# Patient Record
Sex: FEMALE | Race: WHITE | Employment: OTHER | URBAN - METROPOLITAN AREA
[De-identification: names, ages, dates, MRNs, and addresses within clinical notes are randomized per-mention and may not be internally consistent; named-entity substitution may affect disease eponyms.]

---

## 2017-12-29 NOTE — Clinical Note
*AWAITING BED W/ HIGH WEIGHT LIMIT @ [facility]. [facility] NURSING SUPERVISOR (x [id number] & [id number]) PLACING WORK ORDER.

## 2017-12-30 PROBLEM — F33.9 MAJOR DEPRESSION, RECURRENT (HCC): Status: ACTIVE | Noted: 2017-12-30

## 2017-12-30 PROBLEM — F43.10 PTSD (POST-TRAUMATIC STRESS DISORDER): Status: ACTIVE | Noted: 2017-12-30

## 2017-12-30 PROBLEM — F33.9 MAJOR DEPRESSION, RECURRENT (HCC): Status: RESOLVED | Noted: 2017-12-30 | Resolved: 2017-12-30

## 2017-12-30 NOTE — BH LEVEL OF CARE ASSESSMENT
Level of Care Assessment Note    General Questions  Why are you here?: INCREASED DEPRESSION & SI W/ PLAN TO EITHER CUT WRISTS OR OVERDOSE ON IBUPROFEN. W/OUT PSYCH MEDS x 1 MONTH.   THRESHOLDS  WOULN'T SEE HER @ HER FRIEND'S HOME IN [facility] [place] Ideation: Yes  Date of Most Recent Suicidal Ideation: 12/29/17  Current/Recent/Future Suicide Plan: Yes  Date of Most Recent Suicide Plan: 12/29/17  Current/Recent/Future Suicidal Intent: Yes  Date of Most Recent Suicidal Intent: 12/29/17  Describe Current disturbance;Isolative; Increased irritability; Feelings of worthlessness; Appetite change; Change in energy level; Loss of interest  Anxiety Symptoms: Generalized  Trauma Reaction: Flashbacks  Bipolar Symptoms: No problems reported or observed  Sleep Pattern: O No  Breathalyzer (when indicated): 0 (BAL = < 3  ( NONE DETETCTED ))  Process Addiction/ Behaviors  Repetitive/Compulsive Behaviors in the past 30 days: No                Functional Impairment  Currently Attending School: No  Employment Status: Disabled (S LET HER LEAVE & DROVE HER TO ER INSTEAD. D/C'D FROM [facility] 11/17/207. [address], [facility] [address], [employer] RXs. Angelika Tolbert MD 2.5 WEEKS AGO & HE CALLED THE RXs IN TO THE WRONG PHARMACY. TOO OVERWHELMED TO PICK THEM UP.    DID GET THE RX FOR WE ISSUES    Sign-In  Information Provided: Program information  Expected Discharge Date: 01/03/18              SRAT Review  Behavioral Precautions: Suicide  Medical Precautions: None

## 2017-12-30 NOTE — ED NOTES
Pt reports slight tightness in chest and requesting breathing treatment. Dr. Andrei Mendez aware and RT paged.

## 2017-12-30 NOTE — ED NOTES
Pt calm and cooperative, pt belongings placed in smart safe bag [id number] and pt wallet, keys and cell phone placed in smart safe bag [id number] and placed in pt suitcase labeled at nurses station.

## 2017-12-30 NOTE — ED PROVIDER NOTES
Patient Seen in: [facility] Emergency Department    History   Patient presents with:  Eval-P (psychiatric)    Stated Complaint: si    HPI    43-year-old female with history of depression presents for evaluation of suicidal thoughts.   Patient states Normal speech pattern  Musculoskeletal: No tenderness or deformity noted. Full range of motion throughout.         ED Course     Labs Reviewed   COMP METABOLIC PANEL (14) - Abnormal; Notable for the following:        Result Value    Alkaline Phosphatase 1

## 2018-01-17 PROBLEM — F33.9 MAJOR DEPRESSION, RECURRENT (HCC): Status: ACTIVE | Noted: 2018-01-17

## 2018-01-17 NOTE — BH PROGRESS NOTE
Adult unit did not call writer back. Writer called unit again to provide report. Report given to Lindsey. Nurse to nurse information place on pts chart with petition. ER md to complete cert.

## 2018-01-17 NOTE — BH PROGRESS NOTE
Pt aware and agreeable to inpt care. Pt aware and agreeable to boarding. Writer called to give unit report and no one was available. Adult unit will call writer back. Once writer gives unit report, writer will give ER RN the number for nurse to nurse.

## 2018-01-17 NOTE — PROGRESS NOTES
01/17/18 1330   General Appearance   Characteristics Appropriate clothing;Good hygiene   Eye Contact Direct   Psychomotor Behavior   Gait/Movement Steady   Abnormal movements None   Posture Relaxed   Rate of Movement Normal   Mood and Affect   Mood or F

## 2018-01-17 NOTE — ED PROVIDER NOTES
Patient Seen in: [facility] Emergency Department    History   Patient presents with:  Eval-P (psychiatric)    Stated Complaint: +s/i with a plan    HPI    Patient presents to the emergency department with t suicidal ideation.   She states that she ha facial hair, very flat affect, lungs are clear to auscultation heart has regular rate and rhythm without murmurs rubs or gallops abdomen is obese but soft nontender extremities are benign    ED Course     Labs Reviewed   COMP METABOLIC PANEL (14) - Abnorma transfer  1/17/2018  2:10 pm    Follow-up:  No follow-up provider specified.       Medications Prescribed:  Current Discharge Medication List

## 2018-01-17 NOTE — BH LEVEL OF CARE ASSESSMENT
Level of Care Assessment Note    General Questions  Why are you here?: depression and SI with plan  Precipitating Events: Pt was recently dc from [facility] on 1/4/18.  Pt reports feeling better after last inpt, pt rpeorts she was restarted on most of her meds and Mitigating Factors: coming to  er.  Pt reports she has a good support network  Current/Recent Suicide Risk Collateral Provided By[de-identified] per er  Describe Current/Recent Suicide Risk Collateral: pt reports increase in SI and has plan to overdose  Past Suicidal Id problems reported or observed  Depression Symptoms: Change in energy level;Crying; Feelings of helplessness; Feelings of hopelessess; Feelings of worthlessness;Sleep disturbance; Appetite change;Isolative; Loss of interest  Depression Description: Pt rpeorts hx Residential  Name: NONE  Prior Psychiatrist  Name: Dinah Hernandez in [address] [place] [address]  Dates of Treatment: 2.5 yrs  Date Last Seen: sept 2017  Reason: medications  Current/Previous MH/CD Treatment  Recovery Support Groups:  Other support groups (comme Contact: Direct  Psychomotor Behavior  Gait/Movement: Steady  Abnormal movements: None  Posture: Relaxed  Rate of Movement: Normal  Mood and Affect  Mood or Feelings: Depressed; Hopeless; Worthless;Calm  Appropriateness of Affect: Congruent to mood  Range of Suicide  Medical Precautions: None  Refused Treatment: No  Education Provided: Call 911 in an Emergency;[facility] Crisis Line Number;Advised to call if condition worsens; Advised to call with questions  Transferred: No    Primary Psychiatric Diagnosis  Depressive

## 2018-01-18 PROBLEM — F33.9 MAJOR DEPRESSION, RECURRENT (HCC): Status: RESOLVED | Noted: 2018-01-17 | Resolved: 2018-01-18

## 2018-11-29 PROBLEM — F33.3 MAJOR PSYCHOTIC DEPRESSION, RECURRENT (HCC): Status: ACTIVE | Noted: 2018-11-29

## 2018-11-30 PROBLEM — F33.3 MAJOR PSYCHOTIC DEPRESSION, RECURRENT (HCC): Status: RESOLVED | Noted: 2018-11-29 | Resolved: 2018-11-30

## 2018-12-05 ENCOUNTER — APPOINTMENT (OUTPATIENT)
Dept: GENERAL RADIOLOGY | Facility: HOSPITAL | Age: 28
End: 2018-12-05
Attending: EMERGENCY MEDICINE
Payer: MEDICARE

## 2018-12-05 ENCOUNTER — HOSPITAL ENCOUNTER (OUTPATIENT)
Facility: HOSPITAL | Age: 28
Setting detail: OBSERVATION
Discharge: HOME | End: 2018-12-07
Attending: EMERGENCY MEDICINE | Admitting: HOSPITALIST
Payer: MEDICARE

## 2018-12-05 DIAGNOSIS — G47.33 OBSTRUCTIVE SLEEP APNEA: ICD-10-CM

## 2018-12-05 DIAGNOSIS — R06.89 HYPERCARBIA: ICD-10-CM

## 2018-12-05 DIAGNOSIS — F32.A DEPRESSION, UNSPECIFIED DEPRESSION TYPE: ICD-10-CM

## 2018-12-05 DIAGNOSIS — R40.0 SOMNOLENCE, DAYTIME: Primary | ICD-10-CM

## 2018-12-05 PROBLEM — E87.2 RESPIRATORY ACIDOSIS: Status: ACTIVE | Noted: 2018-12-05

## 2018-12-05 PROBLEM — E87.3 METABOLIC ALKALOSIS: Status: ACTIVE | Noted: 2018-12-05

## 2018-12-05 PROCEDURE — 99220 INITIAL OBSERVATION CARE,LEVL III: CPT | Performed by: INTERNAL MEDICINE

## 2018-12-05 PROCEDURE — 71045 X-RAY EXAM CHEST 1 VIEW: CPT | Performed by: EMERGENCY MEDICINE

## 2018-12-05 RX ORDER — MAGNESIUM HYDROXIDE/ALUMINUM HYDROXICE/SIMETHICONE 120; 1200; 1200 MG/30ML; MG/30ML; MG/30ML
30 SUSPENSION ORAL EVERY 4 HOURS PRN
COMMUNITY

## 2018-12-05 RX ORDER — ALBUTEROL SULFATE 2.5 MG/3ML
2.5 SOLUTION RESPIRATORY (INHALATION)
Status: DISCONTINUED | OUTPATIENT
Start: 2018-12-05 | End: 2018-12-07

## 2018-12-05 RX ORDER — METOCLOPRAMIDE HYDROCHLORIDE 5 MG/ML
10 INJECTION INTRAMUSCULAR; INTRAVENOUS EVERY 8 HOURS PRN
Status: DISCONTINUED | OUTPATIENT
Start: 2018-12-05 | End: 2018-12-06

## 2018-12-05 RX ORDER — IBUPROFEN 600 MG/1
600 TABLET ORAL ONCE
Status: COMPLETED | OUTPATIENT
Start: 2018-12-05 | End: 2018-12-05

## 2018-12-05 RX ORDER — ONDANSETRON 4 MG/1
4 TABLET, ORALLY DISINTEGRATING ORAL EVERY 4 HOURS PRN
COMMUNITY

## 2018-12-05 RX ORDER — PANTOPRAZOLE SODIUM 20 MG/1
20 TABLET, DELAYED RELEASE ORAL
Status: DISCONTINUED | OUTPATIENT
Start: 2018-12-06 | End: 2018-12-07

## 2018-12-05 RX ORDER — ACETAMINOPHEN 325 MG/1
650 TABLET ORAL EVERY 6 HOURS PRN
Status: DISCONTINUED | OUTPATIENT
Start: 2018-12-05 | End: 2018-12-07

## 2018-12-05 RX ORDER — DEXTROSE MONOHYDRATE 25 G/50ML
50 INJECTION, SOLUTION INTRAVENOUS
Status: DISCONTINUED | OUTPATIENT
Start: 2018-12-05 | End: 2018-12-07

## 2018-12-05 RX ORDER — HEPARIN SODIUM 5000 [USP'U]/ML
5000 INJECTION, SOLUTION INTRAVENOUS; SUBCUTANEOUS EVERY 8 HOURS SCHEDULED
Status: DISCONTINUED | OUTPATIENT
Start: 2018-12-05 | End: 2018-12-05

## 2018-12-05 RX ORDER — IBUPROFEN 600 MG/1
600 TABLET ORAL EVERY 6 HOURS PRN
COMMUNITY

## 2018-12-05 RX ORDER — BACLOFEN 10 MG/1
10 TABLET ORAL 2 TIMES DAILY PRN
Status: DISCONTINUED | OUTPATIENT
Start: 2018-12-05 | End: 2018-12-07

## 2018-12-05 RX ORDER — HEPARIN SODIUM 5000 [USP'U]/ML
7500 INJECTION, SOLUTION INTRAVENOUS; SUBCUTANEOUS EVERY 8 HOURS SCHEDULED
Status: DISCONTINUED | OUTPATIENT
Start: 2018-12-06 | End: 2018-12-07

## 2018-12-05 RX ORDER — TOPIRAMATE 25 MG/1
50 TABLET ORAL 2 TIMES DAILY
Status: DISCONTINUED | OUTPATIENT
Start: 2018-12-05 | End: 2018-12-07

## 2018-12-05 RX ORDER — LIOTHYRONINE SODIUM 25 UG/1
25 TABLET ORAL
Status: DISCONTINUED | OUTPATIENT
Start: 2018-12-06 | End: 2018-12-07

## 2018-12-05 RX ORDER — ONDANSETRON 2 MG/ML
4 INJECTION INTRAMUSCULAR; INTRAVENOUS EVERY 6 HOURS PRN
Status: DISCONTINUED | OUTPATIENT
Start: 2018-12-05 | End: 2018-12-07

## 2018-12-05 RX ORDER — RISPERIDONE 1 MG/1
3 TABLET, FILM COATED ORAL 2 TIMES DAILY
Status: DISCONTINUED | OUTPATIENT
Start: 2018-12-05 | End: 2018-12-06

## 2018-12-05 RX ORDER — TRAZODONE HYDROCHLORIDE 100 MG/1
200 TABLET ORAL NIGHTLY
Status: DISCONTINUED | OUTPATIENT
Start: 2018-12-05 | End: 2018-12-06

## 2018-12-05 RX ORDER — PANTOPRAZOLE SODIUM 20 MG/1
40 TABLET, DELAYED RELEASE ORAL
COMMUNITY

## 2018-12-05 RX ORDER — HYDROCODONE BITARTRATE AND ACETAMINOPHEN 10; 325 MG/1; MG/1
1 TABLET ORAL 2 TIMES DAILY PRN
Status: DISCONTINUED | OUTPATIENT
Start: 2018-12-05 | End: 2018-12-06

## 2018-12-05 RX ORDER — TRAZODONE HYDROCHLORIDE 100 MG/1
200 TABLET ORAL NIGHTLY
Status: ON HOLD | COMMUNITY
End: 2018-12-07

## 2018-12-05 RX ORDER — ALBUTEROL SULFATE 90 UG/1
2 AEROSOL, METERED RESPIRATORY (INHALATION) EVERY 4 HOURS PRN
Status: DISCONTINUED | OUTPATIENT
Start: 2018-12-05 | End: 2018-12-07

## 2018-12-05 RX ORDER — ALBUTEROL SULFATE 2.5 MG/3ML
2.5 SOLUTION RESPIRATORY (INHALATION) ONCE
Status: COMPLETED | OUTPATIENT
Start: 2018-12-05 | End: 2018-12-05

## 2018-12-05 RX ORDER — DIAPER,BRIEF,INFANT-TODD,DISP
EACH MISCELLANEOUS 2 TIMES DAILY
COMMUNITY

## 2018-12-05 RX ORDER — LOSARTAN POTASSIUM 25 MG/1
25 TABLET ORAL DAILY
Status: DISCONTINUED | OUTPATIENT
Start: 2018-12-05 | End: 2018-12-07

## 2018-12-05 RX ORDER — HYDROCODONE BITARTRATE AND ACETAMINOPHEN 5; 325 MG/1; MG/1
1 TABLET ORAL 2 TIMES DAILY PRN
Status: ON HOLD | COMMUNITY
End: 2018-12-07

## 2018-12-05 NOTE — CONSULTS
[facility]  Report of Consultation    Charli Bermeo Patient Status:  Emergency    10/9/1990 MRN [id number]   Location [address] Attending Ann-Marie Coronel MD   Hosp Day # 0 PCP None Pcp     Reason for Consultation:  Hypo total) by mouth nightly. (Patient taking differently: Take 200 mg by mouth nightly.  )   Vortioxetine HBr 20 MG Oral Tab Take 20 mg by mouth daily.    Albuterol Sulfate  (90 Base) MCG/ACT Inhalation Aero Soln Inhale 2 puffs into the lungs every 4 (fo 364 lb (165.1 kg)       Wt Readings from Last 6 Encounters:  12/05/18 : (!) 364 lb (165.1 kg)  01/17/18 : (!) 354 lb (160.6 kg)    Intake/Output:  [unfilled]  @IOTHIS SHIFT@    Lab Data Review:  Recent Labs      12/05/18   1337   WBC  7.4   HGB  12.3

## 2018-12-05 NOTE — ED NOTES
Pt resting comfortably in ER cart, Pt has been sleeping, and thus, Pt's oxygen level has been dropping between 88-91% on room air, therefore, PT immediately placed on 2Lit NC.

## 2018-12-05 NOTE — ED NOTES
Pt stable, updated to care plan, new HAYDE \"sitter\" at bedside for PT safety. Updated Pt's sitter to care plan.

## 2018-12-05 NOTE — ED PROVIDER NOTES
Patient Seen in: [facility] Emergency Department    History   Patient presents with:  Dyspnea PHAM SOB (respiratory)  Apnea (respiratory)    Stated Complaint: pham    HPI    26-year-old female complaining of lethargy.   This patient comes from [facility] (Approximate)   SpO2 91%   BMI 66.58 kg/m²         Physical Exam    Patient's morbidly obese her weight is 165 kg her BMI 66. She is somewhat lethargic but easily opens her eyes and answers questions her O2 sat was 94% on room air.   HEENT exam oropharynx the D-Dimer is greater than 0.50 ug/mL (FEU). Proceed with caution from clinical presentation.    URINALYSIS WITH CULTURE REFLEX   POCT PREGNANCY, URINE   RAINBOW DRAW BLUE   RAINBOW DRAW LAVENDER   RAINBOW DRAW LIGHT GREEN   RAINBOW DRAW GOLD     EKG

## 2018-12-05 NOTE — ED INITIAL ASSESSMENT (HPI)
PT rpt feeling CAROL and \"exhausted\" for past 2-days, however, today it was worse. Pt rpt she is in the process of awaiting an official sleep study to be dx w/ sleep apnea, but has no diagnosis as of yet, and has not been on oxygen at night.   Please note t

## 2018-12-05 NOTE — H&P
[facility]  History and Physical     [place] Patient Status:  Emergency    10/9/1990 MRN [id number]   Location [address] [facility] Attending Criselda Deleon MD   Hosp Day # 0 PCP None Pcp     Chief Complaint: SOB    H Rfl:    Paliperidone (INVEGA OR) Take 6 mg by mouth 2 (two) times daily. Disp:  Rfl:    TraZODone HCl 300 MG Oral Tab Take 1 tablet (300 mg total) by mouth nightly.  (Patient taking differently: Take 200 mg by mouth nightly.  ) Disp: 30 tablet Rfl: 0   Vort Psychiatric: Appropriate mood and affect.       Diagnostic Data:      Labs:  Recent Labs   Lab  11/30/18   0651  12/05/18   1337   WBC  8.3  7.4   HGB  15.0  12.3   MCV  88.5  88.5   PLT  423.0  320.0       Recent Labs   Lab  11/30/18   0651  12/05/18   1

## 2018-12-05 NOTE — ED NOTES
Pt resting in ER cart w/ bilateral side rails elevated, Pt remains on cardiac and SpO2 monitor, Pt vitals stable, PT breathing slightly labored, HAYDE sitter remains at Pt bedside.    Confirmed w/ food services Pt food tray is ready and will be picked up by E

## 2018-12-06 ENCOUNTER — APPOINTMENT (OUTPATIENT)
Dept: CV DIAGNOSTICS | Facility: HOSPITAL | Age: 28
End: 2018-12-06
Attending: INTERNAL MEDICINE
Payer: MEDICARE

## 2018-12-06 PROCEDURE — 99225 SUBSEQUENT OBSERVATION CARE: CPT | Performed by: INTERNAL MEDICINE

## 2018-12-06 PROCEDURE — 90792 PSYCH DIAG EVAL W/MED SRVCS: CPT | Performed by: OTHER

## 2018-12-06 PROCEDURE — 93306 TTE W/DOPPLER COMPLETE: CPT | Performed by: INTERNAL MEDICINE

## 2018-12-06 RX ORDER — POTASSIUM CHLORIDE 20 MEQ/1
60 TABLET, EXTENDED RELEASE ORAL DAILY
Status: DISCONTINUED | OUTPATIENT
Start: 2018-12-06 | End: 2018-12-06

## 2018-12-06 RX ORDER — HYDROCODONE BITARTRATE AND ACETAMINOPHEN 5; 325 MG/1; MG/1
1 TABLET ORAL 2 TIMES DAILY PRN
Status: DISCONTINUED | OUTPATIENT
Start: 2018-12-06 | End: 2018-12-07

## 2018-12-06 RX ORDER — PALIPERIDONE 6 MG/1
6 TABLET, EXTENDED RELEASE ORAL 2 TIMES DAILY
Status: DISCONTINUED | OUTPATIENT
Start: 2018-12-06 | End: 2018-12-07

## 2018-12-06 RX ORDER — TRAZODONE HYDROCHLORIDE 100 MG/1
200 TABLET ORAL NIGHTLY PRN
Status: DISCONTINUED | OUTPATIENT
Start: 2018-12-06 | End: 2018-12-07

## 2018-12-06 NOTE — PROGRESS NOTES
Per patient, she does not have anyone that can bring her Vortioxetine medication to the hospital.  Patient states she was receiving the medication at [facility]. Per pharmacy, it is not available at [address]

## 2018-12-06 NOTE — ED NOTES
PT finished eating dinner w/o difficulty or incident, Pt resting comfortably in ER cart, breathing non labored, HAYDE sitter at bedside, Pt no complaints at this time.  Pt was able to ambulate independently to the bathroom w/o difficulty or incident, urinary

## 2018-12-06 NOTE — PLAN OF CARE
NURSING ADMISSION NOTE      Patient admitted via Cart  Oriented to room. Safety precautions initiated. Bed in low position. Call light in reach. Patient admitted to [address] [facility] very lethargic.  Sleeping upon transfer but awoke to answer orientation quest

## 2018-12-06 NOTE — RESPIRATORY THERAPY NOTE
Attempted to do pre/post PFT. Pt just asleep within the past hour. Unable to arouse pt enough to do PFT. Will do in the morning when pt is awake.

## 2018-12-06 NOTE — PROGRESS NOTES
[facility]  Progress Note    Franck Iglesias Patient Status:  Observation    10/9/1990 MRN [id number]   [facility] 3NE-A Attending Jessica Patel MD   [facility] Day # 0 PCP None Pcp     Subjective:  Franck Iglesias is a(n) 33year old female HTN     Morbid obesity ([facility] [address]     Obstructive sleep apnea     Hypercarbia     Depression, unspecified depression type    ASSESSMENT  1. Hypoxia /hypercapnia suspected multifactorial with component of bronchospasm and suspected OHS /CHARISMA  2.  Snoring / apneas-

## 2018-12-06 NOTE — ED NOTES
RN to RN rpt given, all questions answered, w/o difficulty; ER RN to transport PT to the floor w/ cardiac monitor attached

## 2018-12-06 NOTE — CONSULTS
[facility]  Report of Psychiatric Consultation    Janyalice Mtzsavanah Patient Status:  Observation    10/9/1990 MRN [id number]   [facility] 3NE-A Attending Clarice Howard [address] Day # 0 PCP None Pcp     Date of Admission: 18 prn pain. Use Norco 5/325 dosing 1 tab twice a day prn mod/severe pain. 8) She will need access to an Auto-PAP machine until she gets a sleep study     9) At this time, she does NOT need to go back to [facility].  Once medically stable, she can be disc to get back at the group home staff that upset her. Her Psych APN is [facility]. Her therapist for 2 yrs is Marlene Garnica.     Past Meds- Adderall, Nortriptlyine, hydroxyzine, Abilify, Seroquel, Riseperdal, Latuda, Lithium, Depakote, Lamictal, Pr HYDROcodone-acetaminophen (NORCO)  MG per tab 1 tablet, 1 tablet, Oral, BID PRN  •  Liothyronine Sodium (CYTOMEL) tab 25 mcg, 25 mcg, Oral, Before breakfast  •  Losartan Potassium (COZAAR) tab 25 mg, 25 mg, Oral, Daily  •  Pantoprazole Sodium (PROTON ideation  Homicidal ideation: None noted    Objective       12/06/18  0900   BP: 121/54   Pulse: 71   Resp: 24   Temp: 98.3 °F (36.8 °C)     Appearance: Obese, sitting up in bed, in no acute distress  Behavior: normal psychomotor  Attitude: cooperative and

## 2018-12-06 NOTE — RESPIRATORY THERAPY NOTE
CHARISMA Equipment Usage Summary :            Set Mode :AUTO CPAP W FLEX          Usage in Hours:9;08          90% Pressure (EPAP) : 19           90% Insp Pressure (IPAP);           AHI : 6.5          Supplemental Oxygen : 3     LPM

## 2018-12-06 NOTE — PROGRESS NOTES
[facility]  Progress Note     Jessica Rubin Patient Status:  Observation    10/9/1990 MRN [id number]   [facility] 3NE-A Attending Tony Blandon MD   Hosp Day # 0 PCP None Pcp     Chief Complaint: SOB, fatigue    S: Patient Beata Peters Potassium  25 mg Oral Daily   • Pantoprazole Sodium  20 mg Oral QAM AC   • risperiDONE  3 mg Oral BID   • topiramate  50 mg Oral BID   • TraZODone HCl  200 mg Oral Nightly   • Vortioxetine HBr  20 mg Oral Daily   • Insulin Aspart Pen  1-5 Units Subcutaneou

## 2018-12-06 NOTE — PLAN OF CARE
Pt still lethargic and sleeping all night. Tolerating CPAP, NSR-ST on tele.  Will encourage patient to get up for morning ADLs and handoff to oncoming RN    Diabetes/Glucose Control    • Glucose maintained within prescribed range Progressing        Impaired

## 2018-12-07 VITALS
RESPIRATION RATE: 22 BRPM | DIASTOLIC BLOOD PRESSURE: 82 MMHG | WEIGHT: 293 LBS | TEMPERATURE: 98 F | OXYGEN SATURATION: 97 % | SYSTOLIC BLOOD PRESSURE: 150 MMHG | HEART RATE: 96 BPM | HEIGHT: 62 IN | BODY MASS INDEX: 53.92 KG/M2

## 2018-12-07 PROCEDURE — 99225 SUBSEQUENT OBSERVATION CARE: CPT | Performed by: OTHER

## 2018-12-07 PROCEDURE — 99217 OBSERVATION CARE DISCHARGE: CPT | Performed by: INTERNAL MEDICINE

## 2018-12-07 RX ORDER — TRAZODONE HYDROCHLORIDE 100 MG/1
200 TABLET ORAL NIGHTLY PRN
Qty: 1 TABLET | Refills: 0 | Status: SHIPPED | COMMUNITY
Start: 2018-12-07

## 2018-12-07 NOTE — PHYSICAL THERAPY NOTE
PHYSICAL THERAPY QUICK EVALUATION - INPATIENT    Room Number: 0441/6960-G  Evaluation Date: 12/7/2018  Presenting Problem: hypoxia  Physician Order: PT Eval and Treat    Problem List  Principal Problem:    Somnolence, daytime  Active Problems:    Metabol adjusting bedclothes, sheets and blankets)?: None   -   Sitting down on and standing up from a chair with arms (e.g., wheelchair, bedside commode, etc.): None   -   Moving from lying on back to sitting on the side of the bed?: None   How much help from ano complexity is considered low. Pt would benefit from [facility] to decrease sciatic pain and improve safety.   PT Discharge Recommendations: Outpatient PT    PLAN  Patient has been evaluated and presents with no skilled Physical Therapy needs at [facility]

## 2018-12-07 NOTE — DISCHARGE SUMMARY
[facility]  DISCHARGE SUMMARY     Estefani Signs Patient Status:  Observation    10/9/1990 MRN [id number]   [facility] 3NE-A Attending Anna Nayak MD   [address] Day # 0 PCP None Pcp     Date of Admission: 2018  Date of Dischar tablet  Refills:  0        CONTINUE taking these medications      Instructions Prescription details   Alum & Mag Hydroxide-Simeth [id number] MG/5ML Susp  Commonly known as:  MAALOX      Take 30 mL by mouth every 4 (four) hours as needed for Indigestion. CHRISSY Beasley   [phone]      As needed to get sleep study at [facility] [address] or [address]     Appointments for Next 30 Days 12/10/2018 - 1/9/2019    None          Vital signs:         ---------------------------------------------------------------------

## 2018-12-07 NOTE — PLAN OF CARE
Resumed care of pt. At [address]. Pt. Is Ax4, cooperative, and pleasant. SI precautions discontinued by MD. Robertson, 1800 stas diet-Accu: QID  Home medications in pt.  Sophy pressley RA, CPAP at [facility], Tele: NS-ST  PIV:SL  Trazadone and Tylenol given via MAR  Bed is in

## 2018-12-07 NOTE — CM/SW NOTE
Pt requested cab voucher, voucher provided [place] [employer]. SW Supervisor [facility] approved vouchers.

## 2018-12-07 NOTE — PLAN OF CARE
Pt alert and oriented. C/o BLE pain, PRN tylenol given with relief noted. Denies n/v, SOB. BG monitored. VSS. Afebrile. Neb tx QID. Cards consult. This RN spoke to [facility] regarding pt's belongings that were not brought over to pt's room yesterday.  Pt's

## 2018-12-07 NOTE — RESPIRATORY THERAPY NOTE
CHARISMA - Equipment Use Daily Summary:                  . Set Mode:AUTO CPAP WITH C-FLEX                . Usage in hours:8:48                . 90% Pressure (EPAP) level:16.3                . 90% Insp. Pressure (IPAP): Ottoniel Lloyd AHI:5.4                .  Junior Pump

## 2018-12-07 NOTE — PROCEDURES
Dominik Edward is a 30-year-old  female who stands 5 feet 2 inches tall and weighs 363 pounds. She underwent pre-and postbronchodilator spirometry on 12/6/18. She carries a diagnosis of dyspnea.   Results are as follows:    FVC is 2.63 L which is

## 2018-12-07 NOTE — PROGRESS NOTES
[facility]  Progress Note    Nenita Mcnair Patient Status:  Observation    10/9/1990 MRN [id number]   [facility] 3NE-A Attending Paula Cadet MD   [facility] Day # 0 PCP None Pcp     Subjective:  Nenita Mcnair is a(n) 33year old female Obstructive sleep apnea     Hypercarbia     Depression, unspecified depression type     Borderline personality disorder ([facility] [address]       ASSESSMENT  1.  Hypoxia /minimal hypercapnia suspected multifactorial with component of bronchospasm and suspected OHS /CHARISMA -

## 2018-12-07 NOTE — PROGRESS NOTES
0226  Pt. Had 3 heart pauses via Jordy Sprinkles in Telemetry  Pause 1 was 4 seconds  Pause 2 was 3.5 seconds  Pause 3 was 3.2 seconds    Pt. Denies any CP or SOB    0229  Paged Dr. Ajay Andrew in regards to heart pauses.   Dr. Ajay Andrew made aware-Per MD, okay to page Dr. Crescencio Cantu

## 2018-12-07 NOTE — PROGRESS NOTES
[facility]  Progress Note     Anthony Bhandari Patient Status:  Observation    10/9/1990 MRN [id number]   [facility] 3NE-A Attending Ochoa Chung MD   Hosp Day # 0 PCP None Pcp     Chief Complaint: SOB, fatigue    S: Patient had p HBr  20 mg Oral Daily   • Insulin Aspart Pen  1-5 Units Subcutaneous TID CC and HS   • albuterol sulfate  2.5 mg Nebulization QID   • Heparin Sodium (Porcine)  7,500 Units Subcutaneous Q8H [address]       ASSESSMENT / PLAN:     1.  Dyspnea/Hypoxia/Presumed CHARISMA  1

## 2018-12-07 NOTE — PLAN OF CARE
Diabetes/Glucose Control    • Glucose maintained within prescribed range Progressing        Impaired Cognition    • Patient will exhibit improved attention, thought processing and/or memory Progressing        METABOLIC/FLUID AND ELECTROLYTES - ADULT    • G

## 2018-12-07 NOTE — CONSULTS
[facility] Initial Consult Note      Greg Alter Patient Status:  Observation    10/9/1990 MRN [id number]   [facility] 3NE-A Attending Ebony Rhodes MD   Hosp Day # 0 PCP None Pcp     Reason for Consultation:  Froy Sullivan PRN  •  Liothyronine Sodium (CYTOMEL) tab 25 mcg, 25 mcg, Oral, Before breakfast  •  Losartan Potassium (COZAAR) tab 25 mg, 25 mg, Oral, Daily  •  Pantoprazole Sodium (PROTONIX) EC tab 20 mg, 20 mg, Oral, QAM AC  •  topiramate (Topamax) tab 50 mg, 50 mg, O bruit  Cardiac: RRR, no M/R/G  Lungs: CTAB  Abdomen: Soft, NT, ND, BS+  Extremities: Warm, well perfused, normal cap refill, no edema  Skin: Warm and dry. Labs:     Recent Labs   Lab  12/05/18   1337   GLU  85  86   BUN  12  11   CREATSERUM  0.90  0.

## 2018-12-08 NOTE — PLAN OF CARE
NURSING DISCHARGE NOTE    Discharged home via wheelchair. Accompanied by support staff. Belongings sent home with pt. Discharge instructions, f/u appointments discussed with pt. All of pt's home meds returned to her from pt bin, pharmacy.  Pt's bel

## 2022-03-10 NOTE — PROGRESS NOTES
PSYCH CONSULT    Date of Admission: 12/5/18  Date of Consult: 12/6/18  Reason for Consultation: Suicidal ideation     Impression:  Primary Psychiatric Diagnosis:  Acute delirium--- improved. She is alert and no longer lethargic.  She is attentive and organi Past Medical History:   Diagnosis Date    Acute deep vein thrombosis (DVT) of brachial vein of right upper extremity 2017    on RUE ultrasound    Anxiety     Bowel perforation     Chronic pain     Crohn's disease of both small and large intestine with intestinal obstruction     Difficult intravenous access     Encounter for blood transfusion     GERD (gastroesophageal reflux disease)     Kidney stones     Nephrolithiasis     Stricture of bowel        Past Surgical History:   Procedure Laterality Date    ABSCESS DRAINAGE      ANAL FISTULOTOMY      BOWEL RESECTION      small bowel resection per Dr. Uribe 2018     SECTION      x2    CHOLECYSTECTOMY  2000    COLON SURGERY  2015    sigmoid loop colostomy    COLONOSCOPY N/A 2016    Procedure: COLONOSCOPY;  Surgeon: Andre Uribe MD;  Location: [facility] ENDO (4TH FLR);  Service: Endoscopy;  Laterality: N/A;    COLONOSCOPY N/A 2017    Procedure: COLONOSCOPY;  Surgeon: Dany Stock MD;  Location: [facility] ENDO (2ND FLR);  Service: Endoscopy;  Laterality: N/A;    COLONOSCOPY N/A 2017    Procedure: COLONOSCOPY;  Surgeon: Andre Uribe MD;  Location: [facility] ENDO (4TH FLR);  Service: Endoscopy;  Laterality: N/A;    COLONOSCOPY N/A 2018    Procedure: COLONOSCOPY;  Surgeon: Andre Uribe MD;  Location: [facility] ENDO (4TH FLR);  Service: Endoscopy;  Laterality: N/A;    COLONOSCOPY N/A 3/24/2018    Procedure: COLONOSCOPY;  Surgeon: Elmer Serrato MD;  Location: [facility] ENDO (2ND FLR);  Service: Endoscopy;  Laterality: N/A;    COLONOSCOPY  3/24/2018    COLONOSCOPY N/A 2018    Procedure: COLONOSCOPY;  Surgeon: Andre Uribe MD;  Location: [facility] ENDO (4TH FLR);  Service: Endoscopy;  Laterality: N/A;    COLONOSCOPY N/A 10/7/2021    Procedure: COLONOSCOPY;  Surgeon: Jose Hughes MD;  Location: [facility] ENDO;  Service: Endoscopy;  Laterality: N/A;    ESOPHAGOGASTRODUODENOSCOPY N/A 10/7/2021    Procedure: EGD  (ESOPHAGOGASTRODUODENOSCOPY);  Surgeon: Jose Hughes MD;  Location: [facility];  Service: Endoscopy;  Laterality: N/A;    LAPAROSCOPIC CLOSURE OF COLOSTOMY N/A 8/29/2018    Procedure: CLOSURE, COLOSTOMY, LAPAROSCOPIC;  Surgeon: Andre Uribe MD;  Location: [facility] OR [employer] FLR;  Service: Colon and Rectal;  Laterality: N/A;  converted to open    LYSIS OF ADHESIONS N/A 1/20/2022    Procedure: LYSIS, ADHESIONS;  Surgeon: LLUVIA Post MD;  Location: [facility] OR 2ND FLR;  Service: Colon and Rectal;  Laterality: N/A;  lasting longer than 2 hours, modifier 22      rectovaginal fistula repair  01/2018    SMALL INTESTINE SURGERY  1995    per patient 10 inches removed    SMALL INTESTINE SURGERY  02/2009    abdominal abscess drained, 3 cm colon removed, 11 cm SB removed    TUBAL LIGATION      UPPER GASTROINTESTINAL ENDOSCOPY  2000       Review of patient's allergies indicates:   Allergen Reactions    Remicade [infliximab] Shortness Of Breath and Palpitations     Severe muscle and joint, and bone pain    Morphine Other (See Comments)     Abdominal pain    Flagyl [metronidazole] Other (See Comments)     Neuropathy    Nubain [nalbuphine] Anxiety     Upper abdominal burning        No current facility-administered medications on file prior to encounter.     Current Outpatient Medications on File Prior to Encounter   Medication Sig    calcium carbonate (TUMS ORAL) Take 1 tablet by mouth as needed (acid reflux).    hydroCHLOROthiazide (MICROZIDE) 12.5 mg capsule Take 12.5 mg by mouth once daily.    HYDROcodone-acetaminophen (NORCO)  mg per tablet Take 1 tablet by mouth every 6 (six) hours as needed.    levETIRAcetam (KEPPRA) 500 MG Tab Take 1 tablet (500 mg total) by mouth 2 (two) times daily.    loperamide (IMODIUM) 2 mg capsule Take 2 capsules (4 mg total) by mouth 4 (four) times daily.    pantoprazole (PROTONIX) 40 MG tablet Take 40 mg by mouth once daily.    potassium chloride SA (K-DUR,KLOR-CON) 20 MEQ tablet Take 40 mEq  [place]. Once medically stable, she can be discharged home with follow up with her psych APN and her psychotherapist (she sees twice a day). She also has a dialectic behavioral therapy support group once a week. Full note to follow.     Dr. Jatin Jon by mouth 2 (two) times daily.    promethazine (PHENERGAN) 25 MG tablet Take 1 tablet (25 mg total) by mouth every 8 (eight) hours as needed for Nausea.    venlafaxine (EFFEXOR) 75 MG tablet Take 75 mg by mouth 2 (two) times daily.    aMILoride (MIDAMOR) 5 MG Tab Take 5 mg by mouth once daily.    ustekinumab (STELARA) 90 mg/mL Syrg syringe Inject 1 mL (90 mg total) into the skin every 8 weeks.    [DISCONTINUED] calcium-vitamin D3 (OS-CHEIKH 500 + D3) 500 mg-5 mcg (200 unit) per tablet Take 2 tablets by mouth 2 (two) times daily with meals.    [DISCONTINUED] cyanocobalamin 1,000 mcg/mL injection Inject 1,000 mcg into the skin every 30 days.    [DISCONTINUED] cyclobenzaprine (FLEXERIL) 10 MG tablet Flexeril 10 mg tablet   Take 1 tablet 3 times a day by oral route.    [DISCONTINUED] dicyclomine (BENTYL) 20 mg tablet Take 20 mg by mouth every 6 (six) hours.    [DISCONTINUED] dicyclomine (BENTYL) 20 mg tablet dicyclomine 20 mg tablet   Take 1 tablet 4 times a day by oral route.    [DISCONTINUED] oxyCODONE (ROXICODONE) 10 mg Tab immediate release tablet Take 1 tablet (10 mg total) by mouth every 6 (six) hours as needed for Pain. (Patient not taking: No sig reported)    [DISCONTINUED] pantoprazole (PROTONIX) 20 MG tablet Take 20 mg by mouth every evening.    [DISCONTINUED] vitamin D (VITAMIN D3) 1000 units Tab Take 1,000 Units by mouth.     Family History       Problem Relation (Age of Onset)    Cancer Maternal Aunt (66)    Heart attack Father (69)          Tobacco Use    Smoking status: Never Smoker    Smokeless tobacco: Never Used   Substance and Sexual Activity    Alcohol use: No     Alcohol/week: 0.0 standard drinks    Drug use: No    Sexual activity: Not on file     Review of Systems   Constitutional:  Positive for fatigue. Negative for chills and fever.   HENT:  Negative for sore throat.    Eyes:  Negative for redness and itching.   Respiratory:  Negative for chest tightness and shortness of breath.    Cardiovascular:   Negative for chest pain and palpitations.   Gastrointestinal:  Positive for abdominal pain and diarrhea. Negative for blood in stool and nausea.   Genitourinary:  Negative for dysuria.   Musculoskeletal:  Negative for gait problem and joint swelling.   Skin:  Positive for wound.   Neurological:  Negative for tremors and weakness.   Psychiatric/Behavioral:  Negative for behavioral problems and sleep disturbance.    All other systems reviewed and are negative.  Objective:     Vital Signs (Most Recent):  Temp: 98.3 °F (36.8 °C) (03/09/22 2221)  Pulse: 97 (03/10/22 0330)  Resp: 20 (03/09/22 2221)  BP: 132/69 (03/10/22 0330)  SpO2: 98 % (03/10/22 0330) Vital Signs (24h Range):  Temp:  [98.3 °F (36.8 °C)] 98.3 °F (36.8 °C)  Pulse:  [] 97  Resp:  [20] 20  SpO2:  [98 %-100 %] 98 %  BP: (117-138)/(62-86) 132/69     Weight: 49 kg (108 lb)  Body mass index is 16.42 kg/m².    Physical Exam  Vitals and nursing note reviewed.   Constitutional:       Appearance: She is well-developed.   HENT:      Head: Atraumatic.   Eyes:      Extraocular Movements: EOM normal.      Pupils: Pupils are equal, round, and reactive to light.   Neck:      Vascular: No JVD.   Cardiovascular:      Rate and Rhythm: Normal rate and regular rhythm.      Heart sounds: Normal heart sounds. No murmur heard.    No gallop.   Pulmonary:      Effort: No respiratory distress.      Breath sounds: Normal breath sounds. No wheezing.   Abdominal:      General: Bowel sounds are normal. There is no distension.      Palpations: Abdomen is soft.      Tenderness: There is no guarding or rebound.      Comments: Midline incision from recent surgery   Musculoskeletal:      Cervical back: Neck supple.   Lymphadenopathy:      Cervical: No cervical adenopathy.   Skin:     General: Skin is warm.      Capillary Refill: Capillary refill takes less than 2 seconds.      Findings: No rash.   Neurological:      Mental Status: She is alert and oriented to person, place, and time.       Cranial Nerves: No cranial nerve deficit.   Psychiatric:         Behavior: Behavior normal.         CRANIAL NERVES     CN III, IV, VI   Pupils are equal, round, and reactive to light.  Extraocular motions are normal.      Significant Labs: All pertinent labs within the past 24 hours have been reviewed.    Significant Imaging: I have reviewed all pertinent imaging results/findings within the past 24 hours.

## 2022-08-01 NOTE — PROGRESS NOTES
[facility]  Report of Psychiatric Progress Note    Andria Reese Patient Status:  Observation    10/9/1990 MRN [id number]   [facility] 3NE-A Attending Urmila Chopra [address] Day # 0 PCP None Pcp     Date of Admission: 18 Noted. Refill sent   Illness:  34 yo WF with severe major depression was initially admitted to [facility] on 11/29/18 for treatment of her depression with passive suicidal ideation. She stopped some of her psych meds 2 wks prior- Trintellix, Invega, and Cymbalta.  She was rest Twin brother-depression  Aunt-bipolar disorder     Social History:   Pt has a twin who lives with pt mom. She has 3 older siblings whom she is not close to. Pt mom  a man she met on internet and moved away 10 years ago.   Mom does not understand topiramate (Topamax) tab 50 mg, 50 mg, Oral, BID  •  Vortioxetine HBr TABS 20 mg, 20 mg, Oral, Daily  •  glucose (DEX4) oral liquid 15 g, 15 g, Oral, Q15 Min PRN **OR** Glucose-Vitamin C (DEX-4) 4-6 GM-MG chewable tab 4 tablet, 4 tablet, Oral, Q15 Min PRN place, time, situation  Attention and Concentration:   fair  Memory:  intact immediate, recent, remote  Language: Intact naming and repetition  Fund of Knowledge: Able to recite name of [place] president    Insight: fair at this time  Judgment: fair at this carin